# Patient Record
(demographics unavailable — no encounter records)

---

## 2018-08-28 NOTE — EMERGENCY ROOM REPORT
History of Present Illness


General


Source:  Patient, EMS





Present Illness


HPI


Patient presents via EMS after a tonic clonic seizure.  She has a history of 

same.  The last time she had a seizure was a week ago.  She's been noncompliant 

with her medication which is Keppra 1000 mg twice a day.  She bit her tongue 

this time.  This was witnessed at home.  Happen in bed in no trauma.  Accucheck 

in field was 120.





Patient denies fever, chills, nausea, vomiting, diarrhea, rashes, neck pain, 

headache, spine pain.  Her last period was around the ninth or 10th.  She 

denies dysuria.





She used to drink alcohol but has stopped.





Scheduled for MRI today.


Allergies:  


Coded Allergies:  


     IBUPROFEN (Verified  Allergy, Unknown, 8/28/18)





Patient History


Past Medical History:  see triage record


Social History:  Reports: drug use - THC; Denies: smoking, alcohol use - prior


Social History Narrative


student cosmetology


Reviewed Nursing Documentation:  PMH: Agreed; PSxH: Agreed





Review of Systems


All Other Systems:  negative except mentioned in HPI





Physical Exam





Vital Signs








  Date Time  Temp Pulse Resp B/P (MAP) Pulse Ox O2 Delivery O2 Flow Rate FiO2


 


8/28/18 07:07 98.2 73 18 98/63 99 Room Air  





 98.2       








Sp02 EP Interpretation:  reviewed, normal


General Appearance:  well appearing, no apparent distress, GCS 15


Head:  normocephalic


Eyes:  bilateral eye normal inspection, bilateral eye PERRL, bilateral eye EOMI


ENT:  moist mucus membranes, other - Tongue macerations


Neck:  supple


Respiratory:  lungs clear, normal breath sounds


Cardiovascular #1:  regular rate, rhythm


Cardiovascular #2:  2+ radial (R)


Gastrointestinal:  normal inspection, normal bowel sounds, non tender, no mass, 

non-distended


Musculoskeletal:  back normal, gait/station normal, normal range of motion


Neurologic:  alert, oriented x3, CNs III-XII nml as tested, motor strength/tone 

normal, DTRs symmetric, sensory intact, cerebellar normal, normal gait, speech 

normal


Psychiatric:  mood/affect normal


Skin:  normal inspection, warm/dry





Medical Decision Making


Diagnostic Impression:  


 Primary Impression:  


 Seizure


 Additional Impressions:  


 Non compliance with medical treatment


 Tongue macerations


ER Course


Patient with a history of seizures presents with seizure and history of 

noncompliance.  Differential includes breakthrough seizure, noncompliance, 

electrolyte abnormality amongst others.  Patient will be evaluated with chest x-

ray to exclude aspiration.  Patient will be placed on a cardiac monitor.  

Patient will receive IV hydration.  Labs will be obtained.  Patient be given 

Ativan and Keppra here.  Based on history and exam, CT not indicated.





CXR clear.  Labs with normal CBC and CMP.





Patient c/o HA.  L sided.  7/10.  Feels slightly nauseated.  Thinks it might be 

from the medication.  Neuro unchanged.  Zofran and tylenol ordered.





Patient going to have and MRI done at 9am.  Stable to go for this and 

outpatient observation and treatment.





Laboratory Tests








Test


  8/28/18


07:13 8/28/18


07:41


 


White Blood Count


  8.2 K/UL


(4.8-10.8) 


 


 


Red Blood Count


  4.75 M/UL


(4.20-5.40) 


 


 


Hemoglobin


  14.4 G/DL


(12.0-16.0) 


 


 


Hematocrit


  42.0 %


(37.0-47.0) 


 


 


Mean Corpuscular Volume 88 FL (80-99)   


 


Mean Corpuscular Hemoglobin


  30.2 PG


(27.0-31.0) 


 


 


Mean Corpuscular Hemoglobin


Concent 34.3 G/DL


(32.0-36.0) 


 


 


Red Cell Distribution Width


  11.2 %


(11.6-14.8)  L 


 


 


Platelet Count


  158 K/UL


(150-450) 


 


 


Mean Platelet Volume


  7.9 FL


(6.5-10.1) 


 


 


Neutrophils (%) (Auto)


  70.0 %


(45.0-75.0) 


 


 


Lymphocytes (%) (Auto)


  21.1 %


(20.0-45.0) 


 


 


Monocytes (%) (Auto)


  3.7 %


(1.0-10.0) 


 


 


Eosinophils (%) (Auto)


  4.0 %


(0.0-3.0)  H 


 


 


Basophils (%) (Auto)


  1.2 %


(0.0-2.0) 


 


 


Sodium Level


  139 MMOL/L


(136-145) 


 


 


Potassium Level


  4.1 MMOL/L


(3.5-5.1) 


 


 


Chloride Level


  104 MMOL/L


() 


 


 


Carbon Dioxide Level


  26 MMOL/L


(21-32) 


 


 


Anion Gap


  9 mmol/L


(5-15) 


 


 


Blood Urea Nitrogen


  10 mg/dL


(7-18) 


 


 


Creatinine


  0.8 MG/DL


(0.55-1.30) 


 


 


Estimate Glomerular


Filtration Rate > 60 mL/min


(>60) 


 


 


Glucose Level


  115 MG/DL


()  H 


 


 


Calcium Level


  8.5 MG/DL


(8.5-10.1) 


 


 


Total Bilirubin


  0.4 MG/DL


(0.2-1.0) 


 


 


Aspartate Amino Transferase


(AST) 17 U/L (15-37)


  


 


 


Alanine Aminotransferase (ALT)


  27 U/L (12-78)


  


 


 


Alkaline Phosphatase


  83 U/L


() 


 


 


Total Protein


  6.9 G/DL


(6.4-8.2) 


 


 


Albumin


  3.8 G/DL


(3.4-5.0) 


 


 


Globulin 3.1 g/dL   


 


Albumin/Globulin Ratio 1.2 (1.0-2.7)   


 


Salicylates Level


  2.6 ug/mL


(2.8-20)  L 


 


 


Acetaminophen Level


  < 2 MCG/ML


(10-30)  L 


 


 


Serum Alcohol < 3 mg/dL   


 


Urine Color  Pale yellow  


 


Urine Appearance  Clear  


 


Urine pH  6 (4.5-8.0)  


 


Urine Specific Gravity


  


  1.015


(1.005-1.035)


 


Urine Protein


  


  2+ (NEGATIVE)


H


 


Urine Glucose (UA)


  


  Negative


(NEGATIVE)


 


Urine Ketones


  


  Negative


(NEGATIVE)


 


Urine Blood


  


  2+ (NEGATIVE)


H


 


Urine Nitrite


  


  Negative


(NEGATIVE)


 


Urine Bilirubin


  


  Negative


(NEGATIVE)


 


Urine Urobilinogen


  


  Normal MG/DL


(0.0-1.0)


 


Urine Leukocyte Esterase


  


  Negative


(NEGATIVE)


 


Urine RBC


  


  2-4 /HPF (0 -


2)  H


 


Urine WBC


  


  0 /HPF (0 - 2)


 


 


Urine Squamous Epithelial


Cells 


  Few /LPF


(NONE/OCC)


 


Urine Bacteria


  


  None /HPF


(NONE)


 


Urine HCG, Qualitative  Pending  


 


Urine Opiates Screen


  


  Negative


(NEGATIVE)


 


Urine Barbiturates Screen


  


  Negative


(NEGATIVE)


 


Phencyclidine (PCP) Screen


  


  Negative


(NEGATIVE)


 


Urine Amphetamines Screen


  


  Negative


(NEGATIVE)


 


Urine Benzodiazepines Screen


  


  Negative


(NEGATIVE)


 


Urine Cocaine Screen


  


  Negative


(NEGATIVE)


 


Urine Marijuana (THC) Screen


  


  Positive


(NEGATIVE)  H








Rhythm Strip Diag. Results


EP Interpretation:  yes


Rhythm:  NSR, no PVC's, no ectopy





Chest X-Ray Diagnostic Results


Chest X-Ray Diagnostic Results :  


   Chest X-Ray Ordered:  Yes


   # of Views/Limited/Complete:  1 View


   Indication:  Other


   EP Interpretation:  Yes


   Interpretation:  no consolidation, no effusion, no pneumothorax


   Impression:  No acute disease


   Electronically Signed by:  Electronically signed by Oswaldo Ayala MD





Last Vital Signs








  Date Time  Temp Pulse Resp B/P (MAP) Pulse Ox O2 Delivery O2 Flow Rate FiO2


 


8/28/18 08:42 98.0 74 24 120/72 100 Room Air  





 98.1       








Status:  improved


Disposition:  HOME, SELF-CARE


Condition:  Improved











Oswaldo Ayala M.D. Aug 28, 2018 07:15